# Patient Record
Sex: MALE | Race: WHITE | Employment: OTHER | URBAN - METROPOLITAN AREA
[De-identification: names, ages, dates, MRNs, and addresses within clinical notes are randomized per-mention and may not be internally consistent; named-entity substitution may affect disease eponyms.]

---

## 2017-09-19 NOTE — PATIENT DISCUSSION
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 A intranasal approach for a dacryocystorhinostomy was planned. The purpose of the nasal endoscopy was to evaluate a surgical site for abnormalities which could alter the surgical course including turbinate hypertrophy or septal deviation. A Stortz 0 degree endoscope was placed intranasally on both sides and the following observations were noted: tight nasal passage, moderate mucosal swelling, moderate enlargement of turbinates.

## 2017-09-19 NOTE — PATIENT DISCUSSION
Nasolacrimal Duct Obstruction Counseling:  I have examined the patient and discussed or attempted dilation and irrigation of the nasolacrimal system. Obstruction to normal irrigation is found. The anatomy and causation of this problem was explained to the patient in detail. The risks and benefits of a dacryocystorhinostomy was discussed in detail, including the need for placement of a Huang tubes for up to 6 months during the healing process. The patient understands and has had all questions answered and desires to proceed with the surgery as explained.

## 2017-11-10 NOTE — PATIENT DISCUSSION
Resume normal activity. Resume any medications that were discontinued for  surgery. Stop cold compresses. Use prescribed Flonase nasal spray bid in affected nostril(s) for 6 months. Hina Loredo Med Sinus Rinse q day for 6 months and prn congestion. At's prn for itching around tubes.   Plan f/u in 6 months for tube removal.

## 2017-12-11 NOTE — PATIENT DISCUSSION
EPIPHORA  OU: S/P DCR OU. PRESCRIBED ARTIFICIAL TEARS, COUNSELED TO WAIT ON IMPROVEMENT UNTIL GOMEZ TUBE OUT. RETURN FOR FOLLOW UP AS SCHEDULED OR SOONER IF SYMPTOMS WORSEN.  EMYCIN QHS PRN

## 2017-12-11 NOTE — PATIENT DISCUSSION
New Prescription: erythromycin (erythromycin): ointment: 5 mg/gram (0.5 %) a small amount at bedtime as needed into both eyes 12-

## 2018-05-04 NOTE — PATIENT DISCUSSION
The patient had a Huang tube on the left side. Topical anesthetic drops were given to the affected eye. This was followed by severing the tube at the caruncle with elina scissors. A lighted nasal speculum was introduced to the affected nostril and elina sissors were used to cut the retaining 4.0 prolene suture. This was followed by introducing a duck-billed forceps. Under illumination by a lighted nasal speculum, the forceps were used to removed the retained silastic foreign body. The patient did well, and there were no complications.

## 2018-05-04 NOTE — PATIENT DISCUSSION
The patient had a Huang tube on the right side. Topical anesthetic drops were given to the affected eye. This was followed by severing the tube at the caruncle with elina scissors. A lighted nasal speculum was introduced to the affected nostril and elina sissors were used to cut the retaining 4.0 prolene suture. This was followed by introducing a duck-billed forceps. Under illumination by a lighted nasal speculum, the forceps were used to removed the retained silastic foreign body. The patient did well, and there were no complications.

## 2019-10-01 NOTE — PATIENT DISCUSSION
DRY EYE SYNDROME OU, SPK OS: RX ARTIFICIAL TEARS AS NEEDED TO INCREASE COMFORT OU. IF SYMPTOMS PERSIST CONSIDER PUNCTAL PLUGS OR RESTASIS. RTC AS SCHEDULED/SOONER IF SYMPTOMS INCREASE/PERSIST.

## 2020-12-01 ENCOUNTER — NEW PATIENT (OUTPATIENT)
Dept: URBAN - METROPOLITAN AREA CLINIC 44 | Facility: CLINIC | Age: 85
End: 2020-12-01

## 2020-12-01 DIAGNOSIS — D49.2: ICD-10-CM

## 2020-12-01 PROCEDURE — 92285 EXTERNAL OCULAR PHOTOGRAPHY: CPT

## 2020-12-01 PROCEDURE — 67810 INCAL BX EYELID SKN LID MRGN: CPT

## 2020-12-01 PROCEDURE — 99201 LIMITED PROBLEM FOCUSED - E/M: CPT

## 2020-12-01 RX ORDER — ERYTHROMYCIN 5 MG/G
OINTMENT OPHTHALMIC
End: 2020-12-15

## 2020-12-01 ASSESSMENT — VISUAL ACUITY
OD_SC: 20/25
OS_SC: 20/25

## 2020-12-14 ENCOUNTER — APPOINTMENT (RX ONLY)
Dept: URBAN - METROPOLITAN AREA CLINIC 151 | Facility: CLINIC | Age: 85
Setting detail: DERMATOLOGY
End: 2020-12-14

## 2020-12-14 DIAGNOSIS — D69.2 OTHER NONTHROMBOCYTOPENIC PURPURA: ICD-10-CM

## 2020-12-14 DIAGNOSIS — Z71.89 OTHER SPECIFIED COUNSELING: ICD-10-CM

## 2020-12-14 DIAGNOSIS — L81.4 OTHER MELANIN HYPERPIGMENTATION: ICD-10-CM

## 2020-12-14 DIAGNOSIS — Z85.828 PERSONAL HISTORY OF OTHER MALIGNANT NEOPLASM OF SKIN: ICD-10-CM

## 2020-12-14 PROCEDURE — 99202 OFFICE O/P NEW SF 15 MIN: CPT

## 2020-12-14 PROCEDURE — ? COUNSELING

## 2020-12-14 PROCEDURE — ? ADDITIONAL NOTES

## 2020-12-14 PROCEDURE — ? OTHER

## 2020-12-14 ASSESSMENT — LOCATION DETAILED DESCRIPTION DERM
LOCATION DETAILED: LEFT PROXIMAL DORSAL FOREARM
LOCATION DETAILED: LEFT SUPERIOR MEDIAL FOREHEAD
LOCATION DETAILED: RIGHT DISTAL DORSAL FOREARM
LOCATION DETAILED: LEFT ANTERIOR DISTAL UPPER ARM
LOCATION DETAILED: LEFT LATERAL INFERIOR EYELID
LOCATION DETAILED: LEFT MEDIAL MALAR CHEEK
LOCATION DETAILED: RIGHT ANTERIOR PROXIMAL THIGH

## 2020-12-14 ASSESSMENT — LOCATION ZONE DERM
LOCATION ZONE: LEG
LOCATION ZONE: EYELID
LOCATION ZONE: ARM
LOCATION ZONE: FACE

## 2020-12-14 ASSESSMENT — LOCATION SIMPLE DESCRIPTION DERM
LOCATION SIMPLE: RIGHT FOREARM
LOCATION SIMPLE: LEFT CHEEK
LOCATION SIMPLE: LEFT INFERIOR EYELID
LOCATION SIMPLE: RIGHT THIGH
LOCATION SIMPLE: LEFT UPPER ARM
LOCATION SIMPLE: LEFT FOREARM
LOCATION SIMPLE: LEFT FOREHEAD

## 2020-12-14 NOTE — PROCEDURE: OTHER
Other (Free Text): Pt using glycolic body lotion to treat bruising. Pt will purchase vitamin k serum in office and will use that for bruising. He is aware that this can be chronic and worsened by blood thinners.
Detail Level: Zone
Note Text (......Xxx Chief Complaint.): This diagnosis correlates with the

## 2021-01-05 ENCOUNTER — PRE-OP/H&P (OUTPATIENT)
Dept: URBAN - METROPOLITAN AREA CLINIC 44 | Facility: CLINIC | Age: 86
End: 2021-01-05

## 2021-01-05 DIAGNOSIS — D49.2: ICD-10-CM

## 2021-01-05 PROCEDURE — 99211HP H&P OFFICE/OUTPATIENT VISIT, EST

## 2021-01-05 RX ORDER — ERYTHROMYCIN 5 MG/G
OINTMENT OPHTHALMIC
Start: 2021-01-25

## 2021-01-05 RX ORDER — TRAMADOL HCL 50 MG/1
1 TABLET ORAL
Start: 2021-01-25

## 2021-01-21 ENCOUNTER — CONTACT LENS FOLLOW UP (OUTPATIENT)
Dept: URBAN - METROPOLITAN AREA CLINIC 30 | Facility: CLINIC | Age: 86
End: 2021-01-21

## 2021-01-21 DIAGNOSIS — H52.209: ICD-10-CM

## 2021-01-21 PROCEDURE — 92310F

## 2021-01-21 ASSESSMENT — VISUAL ACUITY
OU_CC: 20/20
OS_CC: 20/20-1
OD_CC: 20/25+1

## 2021-01-25 ENCOUNTER — PRE-OP/H&P (OUTPATIENT)
Dept: URBAN - METROPOLITAN AREA SURGERY 14 | Facility: SURGERY | Age: 86
End: 2021-01-25

## 2021-01-25 ENCOUNTER — SURGERY/PROCEDURE (OUTPATIENT)
Dept: URBAN - METROPOLITAN AREA SURGERY 14 | Facility: SURGERY | Age: 86
End: 2021-01-25

## 2021-01-25 DIAGNOSIS — C44.1192: ICD-10-CM

## 2021-01-25 PROCEDURE — 67966 REVISION OF EYELID: CPT

## 2021-01-25 PROCEDURE — 99499 UNLISTED E&M SERVICE: CPT

## 2021-02-04 ENCOUNTER — POST-OP (OUTPATIENT)
Dept: URBAN - METROPOLITAN AREA CLINIC 44 | Facility: CLINIC | Age: 86
End: 2021-02-04

## 2021-02-04 DIAGNOSIS — Z98.890: ICD-10-CM

## 2021-02-04 PROCEDURE — 99024 POSTOP FOLLOW-UP VISIT: CPT

## 2021-02-04 ASSESSMENT — VISUAL ACUITY
OD_SC: 20/30
OS_SC: 20/40

## 2021-09-28 ENCOUNTER — ESTABLISHED COMPREHENSIVE EXAM (OUTPATIENT)
Dept: URBAN - METROPOLITAN AREA CLINIC 30 | Facility: CLINIC | Age: 86
End: 2021-09-28

## 2021-09-28 DIAGNOSIS — E11.9: ICD-10-CM

## 2021-09-28 DIAGNOSIS — H40.013: ICD-10-CM

## 2021-09-28 DIAGNOSIS — H16.143: ICD-10-CM

## 2021-09-28 DIAGNOSIS — H25.13: ICD-10-CM

## 2021-09-28 PROCEDURE — 92083 EXTENDED VISUAL FIELD XM: CPT

## 2021-09-28 PROCEDURE — 92250 FUNDUS PHOTOGRAPHY W/I&R: CPT

## 2021-09-28 PROCEDURE — 92014 COMPRE OPH EXAM EST PT 1/>: CPT

## 2021-09-28 ASSESSMENT — VISUAL ACUITY
OS_CC: 20/40--
OU_CC: 20/30--
OD_CC: 20/30--
OS_PH: 20/40+

## 2021-12-15 ENCOUNTER — FOLLOW UP (OUTPATIENT)
Dept: URBAN - METROPOLITAN AREA CLINIC 30 | Facility: CLINIC | Age: 86
End: 2021-12-15

## 2021-12-15 DIAGNOSIS — E11.9: ICD-10-CM

## 2021-12-15 DIAGNOSIS — H16.143: ICD-10-CM

## 2021-12-15 DIAGNOSIS — H25.13: ICD-10-CM

## 2021-12-15 DIAGNOSIS — H40.013: ICD-10-CM

## 2021-12-15 PROCEDURE — 92083 EXTENDED VISUAL FIELD XM: CPT

## 2021-12-15 PROCEDURE — 99213 OFFICE O/P EST LOW 20 MIN: CPT

## 2021-12-15 PROCEDURE — 92133 CPTRZD OPH DX IMG PST SGM ON: CPT

## 2021-12-15 ASSESSMENT — TONOMETRY
OD_IOP_MMHG: 10
OD_IOP_MMHG: 10
OS_IOP_MMHG: 10
OS_IOP_MMHG: 8

## 2021-12-15 ASSESSMENT — VISUAL ACUITY
OD_PH: 20/30+2
OS_PH: 20/30-2
OD_SC: 20/40
OS_SC: 20/70-1

## 2022-03-15 ENCOUNTER — COMPREHENSIVE EXAM (OUTPATIENT)
Dept: URBAN - METROPOLITAN AREA CLINIC 30 | Facility: CLINIC | Age: 87
End: 2022-03-15

## 2022-03-15 DIAGNOSIS — H25.13: ICD-10-CM

## 2022-03-15 DIAGNOSIS — E11.9: ICD-10-CM

## 2022-03-15 DIAGNOSIS — H16.143: ICD-10-CM

## 2022-03-15 DIAGNOSIS — H40.013: ICD-10-CM

## 2022-03-15 PROCEDURE — 99214 OFFICE O/P EST MOD 30 MIN: CPT

## 2022-03-15 PROCEDURE — 92083 EXTENDED VISUAL FIELD XM: CPT

## 2022-03-15 ASSESSMENT — VISUAL ACUITY
OD_SC: 20/30
OS_CC: 20/100-2
OS_PH: 20/30
OD_PH: 20/40-1
OS_SC: 20/70+1
OD_CC: 20/200+1

## 2022-03-15 ASSESSMENT — TONOMETRY
OD_IOP_MMHG: 10
OS_IOP_MMHG: 9

## 2022-07-12 ENCOUNTER — COMPREHENSIVE EXAM (OUTPATIENT)
Dept: URBAN - METROPOLITAN AREA CLINIC 30 | Facility: CLINIC | Age: 87
End: 2022-07-12

## 2022-07-12 DIAGNOSIS — H40.013: ICD-10-CM

## 2022-07-12 DIAGNOSIS — H35.09: ICD-10-CM

## 2022-07-12 DIAGNOSIS — H25.13: ICD-10-CM

## 2022-07-12 PROCEDURE — 92273 FULL FIELD ERG W/I&R: CPT

## 2022-07-12 PROCEDURE — 99214 OFFICE O/P EST MOD 30 MIN: CPT

## 2022-07-12 PROCEDURE — 92015 DETERMINE REFRACTIVE STATE: CPT

## 2022-07-12 PROCEDURE — 92250 FUNDUS PHOTOGRAPHY W/I&R: CPT

## 2022-07-12 PROCEDURE — 92310-4 LEVEL 4 CONTACT LENS MANAGEMENT

## 2022-07-12 ASSESSMENT — VISUAL ACUITY
OD_PH: 20/30+2
OU_CC: 20/25+1
OS_CC: 20/25-2
OD_CC: 20/40

## 2022-07-12 ASSESSMENT — TONOMETRY
OD_IOP_MMHG: 10
OS_IOP_MMHG: 11

## 2022-07-28 ENCOUNTER — PREPPED CHART (OUTPATIENT)
Dept: URBAN - METROPOLITAN AREA CLINIC 30 | Facility: CLINIC | Age: 87
End: 2022-07-28

## 2022-08-11 ENCOUNTER — CONTACT LENSES/GLASSES VISIT (OUTPATIENT)
Dept: URBAN - METROPOLITAN AREA CLINIC 30 | Facility: CLINIC | Age: 87
End: 2022-08-11

## 2022-08-11 DIAGNOSIS — Z46.0: ICD-10-CM

## 2022-08-11 PROCEDURE — 92310F

## 2022-08-31 ENCOUNTER — CONTACT LENSES/GLASSES VISIT (OUTPATIENT)
Dept: URBAN - METROPOLITAN AREA CLINIC 30 | Facility: CLINIC | Age: 87
End: 2022-08-31

## 2022-08-31 DIAGNOSIS — Z46.0: ICD-10-CM

## 2022-08-31 PROCEDURE — 92310F
